# Patient Record
Sex: FEMALE | Race: WHITE | Employment: OTHER | URBAN - METROPOLITAN AREA
[De-identification: names, ages, dates, MRNs, and addresses within clinical notes are randomized per-mention and may not be internally consistent; named-entity substitution may affect disease eponyms.]

---

## 2017-02-27 ENCOUNTER — APPOINTMENT (OUTPATIENT)
Dept: GENERAL RADIOLOGY | Age: 77
End: 2017-02-27
Attending: EMERGENCY MEDICINE
Payer: MEDICARE

## 2017-02-27 ENCOUNTER — APPOINTMENT (OUTPATIENT)
Dept: CT IMAGING | Age: 77
End: 2017-02-27
Attending: EMERGENCY MEDICINE
Payer: MEDICARE

## 2017-02-27 ENCOUNTER — HOSPITAL ENCOUNTER (EMERGENCY)
Age: 77
Discharge: HOME | End: 2017-02-27
Attending: EMERGENCY MEDICINE | Admitting: EMERGENCY MEDICINE
Payer: MEDICARE

## 2017-02-27 VITALS
TEMPERATURE: 99.2 F | WEIGHT: 109.79 LBS | HEART RATE: 98 BPM | DIASTOLIC BLOOD PRESSURE: 68 MMHG | BODY MASS INDEX: 21.55 KG/M2 | HEIGHT: 60 IN | RESPIRATION RATE: 28 BRPM | OXYGEN SATURATION: 94 % | SYSTOLIC BLOOD PRESSURE: 181 MMHG

## 2017-02-27 DIAGNOSIS — N63.0 BREAST MASS IN FEMALE: ICD-10-CM

## 2017-02-27 DIAGNOSIS — J90 PLEURAL EFFUSION: ICD-10-CM

## 2017-02-27 DIAGNOSIS — I15.8 OTHER SECONDARY HYPERTENSION: ICD-10-CM

## 2017-02-27 DIAGNOSIS — R00.2 PALPITATIONS: Primary | ICD-10-CM

## 2017-02-27 DIAGNOSIS — I49.1 PAC (PREMATURE ATRIAL CONTRACTION): ICD-10-CM

## 2017-02-27 LAB
ANION GAP BLD CALC-SCNC: 8 MMOL/L (ref 5–15)
ATRIAL RATE: 110 BPM
BASOPHILS # BLD AUTO: 0 K/UL (ref 0–0.1)
BASOPHILS # BLD: 1 % (ref 0–1)
BUN SERPL-MCNC: 17 MG/DL (ref 6–20)
BUN/CREAT SERPL: 5 (ref 12–20)
CALCIUM SERPL-MCNC: 8 MG/DL (ref 8.5–10.1)
CALCULATED P AXIS, ECG09: 62 DEGREES
CALCULATED R AXIS, ECG10: -17 DEGREES
CALCULATED T AXIS, ECG11: 48 DEGREES
CHLORIDE SERPL-SCNC: 104 MMOL/L (ref 97–108)
CO2 SERPL-SCNC: 30 MMOL/L (ref 21–32)
CREAT SERPL-MCNC: 3.76 MG/DL (ref 0.55–1.02)
D DIMER PPP FEU-MCNC: 2.3 MG/L FEU (ref 0–0.65)
DIAGNOSIS, 93000: NORMAL
DIFFERENTIAL METHOD BLD: ABNORMAL
EOSINOPHIL # BLD: 0.1 K/UL (ref 0–0.4)
EOSINOPHIL NFR BLD: 1 % (ref 0–7)
ERYTHROCYTE [DISTWIDTH] IN BLOOD BY AUTOMATED COUNT: 14 % (ref 11.5–14.5)
GLUCOSE SERPL-MCNC: 147 MG/DL (ref 65–100)
HCT VFR BLD AUTO: 34.8 % (ref 35–47)
HGB BLD-MCNC: 10.9 G/DL (ref 11.5–16)
LYMPHOCYTES # BLD AUTO: 13 % (ref 12–49)
LYMPHOCYTES # BLD: 1 K/UL (ref 0.8–3.5)
MAGNESIUM SERPL-MCNC: 1.8 MG/DL (ref 1.6–2.4)
MCH RBC QN AUTO: 32 PG (ref 26–34)
MCHC RBC AUTO-ENTMCNC: 31.3 G/DL (ref 30–36.5)
MCV RBC AUTO: 102.1 FL (ref 80–99)
MONOCYTES # BLD: 0.5 K/UL (ref 0–1)
MONOCYTES NFR BLD AUTO: 6 % (ref 5–13)
NEUTS SEG # BLD: 6.4 K/UL (ref 1.8–8)
NEUTS SEG NFR BLD AUTO: 79 % (ref 32–75)
P-R INTERVAL, ECG05: 178 MS
PLATELET # BLD AUTO: 244 K/UL (ref 150–400)
POTASSIUM SERPL-SCNC: 4.4 MMOL/L (ref 3.5–5.1)
Q-T INTERVAL, ECG07: 330 MS
QRS DURATION, ECG06: 92 MS
QTC CALCULATION (BEZET), ECG08: 446 MS
RBC # BLD AUTO: 3.41 M/UL (ref 3.8–5.2)
SODIUM SERPL-SCNC: 142 MMOL/L (ref 136–145)
VENTRICULAR RATE, ECG03: 110 BPM
WBC # BLD AUTO: 8 K/UL (ref 3.6–11)

## 2017-02-27 PROCEDURE — 74011636320 HC RX REV CODE- 636/320: Performed by: EMERGENCY MEDICINE

## 2017-02-27 PROCEDURE — 36415 COLL VENOUS BLD VENIPUNCTURE: CPT | Performed by: EMERGENCY MEDICINE

## 2017-02-27 PROCEDURE — 71020 XR CHEST PA LAT: CPT

## 2017-02-27 PROCEDURE — 93005 ELECTROCARDIOGRAM TRACING: CPT

## 2017-02-27 PROCEDURE — 80048 BASIC METABOLIC PNL TOTAL CA: CPT | Performed by: EMERGENCY MEDICINE

## 2017-02-27 PROCEDURE — 74011250636 HC RX REV CODE- 250/636: Performed by: EMERGENCY MEDICINE

## 2017-02-27 PROCEDURE — 99285 EMERGENCY DEPT VISIT HI MDM: CPT

## 2017-02-27 PROCEDURE — 87040 BLOOD CULTURE FOR BACTERIA: CPT | Performed by: EMERGENCY MEDICINE

## 2017-02-27 PROCEDURE — 96360 HYDRATION IV INFUSION INIT: CPT

## 2017-02-27 PROCEDURE — 85025 COMPLETE CBC W/AUTO DIFF WBC: CPT | Performed by: EMERGENCY MEDICINE

## 2017-02-27 PROCEDURE — 83735 ASSAY OF MAGNESIUM: CPT | Performed by: EMERGENCY MEDICINE

## 2017-02-27 PROCEDURE — 71275 CT ANGIOGRAPHY CHEST: CPT

## 2017-02-27 PROCEDURE — 85379 FIBRIN DEGRADATION QUANT: CPT | Performed by: EMERGENCY MEDICINE

## 2017-02-27 RX ADMIN — IOPAMIDOL 69.4 ML: 755 INJECTION, SOLUTION INTRAVENOUS at 15:27

## 2017-02-27 RX ADMIN — SODIUM CHLORIDE 250 ML: 900 INJECTION, SOLUTION INTRAVENOUS at 13:37

## 2017-02-27 NOTE — PROGRESS NOTES
Per Dr Erin Beauchamp, pt is ok to have IV contrast as long as she returns to dialysis within 24 hrs.  Spoke with Dr Filemon James in [place] [address], relayed info regarding need for dialysis following contrast. She stated she will consult with pts nephrologist regarding contrast.

## 2017-02-27 NOTE — ED NOTES
Patient resting on stretcher talking with family at bedside. Patient updated regarding plan of care and plan to perform CTA. Patient verbalizes understanding and agreement to current plan of care. Family remains at bedside. Call bell in reach. Will continue to monitor.

## 2017-02-27 NOTE — ED TRIAGE NOTES
Pt presents ambulatory using her rollator to the treatment room in no distress. Pt denies chest pain but rpts shortness of breath for the past several months. Pt rpts while having dialysis this am staff noted her HR to be irregular on numerous assessments. Sent to the ED for further evaluation.

## 2017-02-27 NOTE — ED PROVIDER NOTES
HPI Comments: 69 yo WF with hx ESRD (m,w,f), AAA, htn, xol, dementia presents with c/o rapid heart beat. Per pt she was at dialysis today and staff told her, her heart rate was fast and irregular. Pt reports increasing sob over the past month, denies fever and cough. States she has never had an irregular heart rate before, denies recent medication changes, or having diasylate changed recently. Pt thinks she had a remote hx of PE but is not on blood thinners and denies recent car trip    Patient is a 68 y.o. female presenting with palpitations. The history is provided by the patient. Palpitations    Associated symptoms include shortness of breath. Pertinent negatives include no fever. Past Medical History:   Diagnosis Date    AAA (abdominal aortic aneurysm) ([facility] [address] 9/2009    3.2 cm AAA infrarenal    Bipolar affective ([facility] [address]     anxiety    Brain aneurysm     5 mm pericollosal, Dr. Ya Lundberg Cervical spine disease     disc, Dr. Malaika Vela CKD (chronic kidney disease) stage 4, GFR 15-29 ml/min ([facility])     Dr. Kelsi Still Crohn disease [facility])     saw GI, presented as GI bleed    Dementia in Alzheimer's disease     Dr. Denise Enciso HTN (hypertension)     Hyperlipidemia     Knee pain     Dr. Edouard Lopez       Past Surgical History:   Procedure Laterality Date   Catheline Ro Dr. Kathe Cogan    HX APPENDECTOMY      HX CATARACT REMOVAL           Family History:   Problem Relation Age of Onset    Diabetes Sister     Heart Disease Father     Crohn's Disease Mother        Social History     Social History    Marital status:      Spouse name: Jose Roberto Knight Number of children: 1    Years of education: N/A     Occupational History    Not on file.      Social History Main Topics    Smoking status: Former Smoker     Packs/day: 2.00     Years: 50.00     Quit date: 6/1/2009    Smokeless tobacco: Former User    Alcohol use 4.8 oz/week     7 Glasses of wine, 1 Standard drinks or equivalent per week      Comment: 1 glass of wine nightly    Drug use: No    Sexual activity: Not on file     Other Topics Concern    Not on file     Social History Narrative    Son Adelso Barrett, lives in [address] [place] [address] Dr: Fd and c blue no.1    Review of Systems   Constitutional: Negative for fever. Respiratory: Positive for shortness of breath. Cardiovascular: Positive for palpitations. All other systems reviewed and are negative. Vitals:    02/27/17 1323   BP: (!) 160/91   Pulse: 82   Resp: 19   Temp: 98.4 °F (36.9 °C)   SpO2: 94%   Weight: 49.8 kg (109 lb 12.6 oz)   Height: 5' (1.524 m)            Physical Exam   Constitutional: She is oriented to person, place, and time. She appears well-developed and well-nourished. No distress. HENT:   Head: Normocephalic and atraumatic. Eyes: Conjunctivae are normal.   Neck: Normal range of motion. Neck supple. Cardiovascular: Regular rhythm, normal heart sounds and intact distal pulses. Exam reveals no friction rub. No murmur heard. tachycardic   Pulmonary/Chest: Effort normal and breath sounds normal. No respiratory distress. She has no wheezes. She has no rales. Abdominal: Soft. Bowel sounds are normal. She exhibits no distension. There is no tenderness. There is no rebound and no guarding. Musculoskeletal: Normal range of motion. She exhibits no edema or tenderness. Neurological: She is alert and oriented to person, place, and time. Coordination normal.   Skin: Skin is warm and dry. She is not diaphoretic. No pallor. Psychiatric: She has a normal mood and affect. Her behavior is normal.   Nursing note and vitals reviewed. MDM  Number of Diagnoses or Management Options  Breast mass in female: Other secondary hypertension:   PAC (premature atrial contraction):   Palpitations:   Pleural effusion:   Diagnosis management comments: Pt with sinus tach and pac's. Check electrolytes, may be from fluid shifts of dialysis. Given small amount of IVF. ? Remote hx PE. Will check ddimer though sob may be from esrd       Amount and/or Complexity of Data Reviewed  Clinical lab tests: ordered and reviewed  Tests in the radiology section of CPT®: ordered and reviewed  Independent visualization of images, tracings, or specimens: yes (ekg)    Patient Progress  Patient progress: stable    ED Course       Procedures    EKG interpretation: (Preliminary)  Rhythm: normal sinus rhythm; and regular . Rate (approx.): 110; Axis: normal; P wave: normal; QRS interval: normal ; ST/T wave: non-specific changes; sinus tach with pac's    2:34 PM  Spoke with Dr Jeannette Rosa about radiologist requesting dialysis within 24 hours due to receiving IV dye. He states if pt is sob she can call tomorrow and they will get her in for dialysis otherwise she can be dialyzed Wednesday and does not need emergent dialysis after contrast     4:21 PM  Spoke with pt and sister at length. She had been cutting back her dialysis time and explained she may need to stay on longer to get rid of the pleural effusions. Also can increase bumex to 2 pills twice a day for 3 days. bp is elevated but pt reports normally 150-200 at dialysis and not due for her meds until 5 pm. She will follow that up with dr Carisa Lewis as well as i explained that is not good. She will follow up breast mass. Heart rate and pac's resolved with small amount of fluid here and now nsr. Pt will return if worsening sx.

## 2017-02-27 NOTE — DISCHARGE INSTRUCTIONS
We hope that we have addressed all of your medical concerns. The examination and treatment you received in the Emergency Department were for an emergent problem and were not intended as complete care. It is important that you follow up with your healthcare provider(s) for ongoing care. If your symptoms worsen or do not improve as expected, and you are unable to reach your usual health care provider(s), you should return to the Emergency Department. Today's healthcare is undergoing tremendous change, and patient satisfaction surveys are one of the many tools to assess the quality of medical care. You may receive a survey from the [employer] regarding your experience in the Emergency Department. I hope that your experience has been completely positive, particularly the medical care that I provided. As such, please participate in the survey; anything less than excellent does not meet my expectations or intentions. [facility] and [facility] participate in nationally recognized quality of care measures. If your blood pressure is greater than 120/80, as reported below, we urge that you seek medical care to address the potential of high blood pressure, commonly known as hypertension. Hypertension can be hereditary or can be caused by certain medical conditions, pain, stress, or \"white coat syndrome. \"       Please make an appointment with your health care provider(s) for follow up of your Emergency Department visit.        VITALS:   Patient Vitals for the past 8 hrs:   Temp Pulse Resp BP SpO2   02/27/17 1612 - 98 28 - 94 %   02/27/17 1600 - 84 27 181/68 (!) 89 %   02/27/17 1530 - 97 23 189/63 94 %   02/27/17 1528 99.2 °F (37.3 °C) 95 (!) 32 196/64 93 %   02/27/17 1502 - 92 (!) 31 178/65 94 %   02/27/17 1450 - - - - 90 %   02/27/17 1430 - 87 23 167/63 92 %   02/27/17 1400 - 96 29 169/63 94 %   02/27/17 1330 - (!) 101 (!) 31 156/63 94 %   02/27/17 1323 98.4 °F (36.9 °C) 82 19 (!) 160/91 94 %          Thank you for allowing us to provide you with medical care today. We realize that you have many choices for your emergency care needs. Please choose us in the future for any continued health care needs. Alda Hunter MD    [facility], [facility].   Office: [phone]            Recent Results (from the past 24 hour(s))   EKG, 12 LEAD, INITIAL    Collection Time: 02/27/17  1:15 PM   Result Value Ref Range    Ventricular Rate 110 BPM    Atrial Rate 110 BPM    P-R Interval 178 ms    QRS Duration 92 ms    Q-T Interval 330 ms    QTC Calculation (Bezet) 446 ms    Calculated P Axis 62 degrees    Calculated R Axis -17 degrees    Calculated T Axis 48 degrees    Diagnosis       Sinus tachycardia with premature atrial complexes  Moderate voltage criteria for LVH, may be normal variant  Anteroseptal infarct , age undetermined  Abnormal ECG  When compared with ECG of 14-SEP-2013 17:27,  premature atrial complexes are now present  Anteroseptal infarct is now present  Confirmed by Viola Hansen M.D., Giuseppe Platt ([id number]) on 2/27/2017 3:58:24 PM     CBC WITH AUTOMATED DIFF    Collection Time: 02/27/17  1:30 PM   Result Value Ref Range    WBC 8.0 3.6 - 11.0 K/uL    RBC 3.41 (L) 3.80 - 5.20 M/uL    HGB 10.9 (L) 11.5 - 16.0 g/dL    HCT 34.8 (L) 35.0 - 47.0 %    .1 (H) 80.0 - 99.0 FL    MCH 32.0 26.0 - 34.0 PG    MCHC 31.3 30.0 - 36.5 g/dL    RDW 14.0 11.5 - 14.5 %    PLATELET 673 118 - 541 K/uL    NEUTROPHILS 79 (H) 32 - 75 %    LYMPHOCYTES 13 12 - 49 %    MONOCYTES 6 5 - 13 %    EOSINOPHILS 1 0 - 7 %    BASOPHILS 1 0 - 1 %    ABS. NEUTROPHILS 6.4 1.8 - 8.0 K/UL    ABS. LYMPHOCYTES 1.0 0.8 - 3.5 K/UL    ABS. MONOCYTES 0.5 0.0 - 1.0 K/UL    ABS. EOSINOPHILS 0.1 0.0 - 0.4 K/UL    ABS.  BASOPHILS 0.0 0.0 - 0.1 K/UL    DF AUTOMATED     METABOLIC PANEL, BASIC    Collection Time: 02/27/17  1:30 PM   Result Value Ref Range    Sodium 142 136 - 145 mmol/L    Potassium 4.4 3.5 - 5.1 mmol/L    Chloride 104 97 - 108 mmol/L    CO2 30 21 - 32 mmol/L    Anion gap 8 5 - 15 mmol/L    Glucose 147 (H) 65 - 100 mg/dL    BUN 17 6 - 20 MG/DL    Creatinine 3.76 (H) 0.55 - 1.02 MG/DL    BUN/Creatinine ratio 5 (L) 12 - 20      GFR est AA 14 (L) >60 ml/min/1.73m2    GFR est non-AA 12 (L) >60 ml/min/1.73m2    Calcium 8.0 (L) 8.5 - 10.1 MG/DL   MAGNESIUM    Collection Time: 02/27/17  1:30 PM   Result Value Ref Range    Magnesium 1.8 1.6 - 2.4 mg/dL   D DIMER    Collection Time: 02/27/17  1:30 PM   Result Value Ref Range    D-dimer 2.30 (H) 0.00 - 0.65 mg/L FEU       Xr Chest Pa Lat    Result Date: 2/27/2017  Indication: Irregular heartbeat. Shortness of breath for the past several months. Exam: PA and lateral views of the chest. Direct comparison is made to prior CXR dated July 11, 2016. Findings: Cardiomediastinal silhouette is within normal limits. There is left basilar patchy opacification consistent with airspace consolidation and a small left pleural effusion. There is a very small right pleural effusion. There is no pneumothorax. IMPRESSION: Left lower lobe patchy airspace consolidation and small left pleural effusion. Very small right pleural effusion. Cta Chest W Wo Cont    Result Date: 2/27/2017  INDICATION:  Shortness of breath times several months, elevated d-dimer. Dialysis this a.m., assess for pulmonary embolus. Right breast cancer. COMPARISON:  7/8/2016. TECHNIQUE:  Noncontrast images were obtained to localize the pulmonary arteries. Contiguous axial CT images were then obtained of the chest with 69 mL of Isovue-370 contrast using the pulmonary angio protocol. 2D post processing was utilized, and coronal and sagittal reformatted images were obtained. In addition 3D post processing was utilized and MIP coronal reconstructed images were obtained.  CT dose reduction was achieved through the use of a standardized protocol tailored for this examination and automatic exposure control for dose modulation. FINDINGS:  No pulmonary embolus is detected. The thoracic aorta the visualized abdominal aorta show diffuse atherosclerotic mural calcification. No dissecting aneurysm is seen. Increased pleural effusions, small on the right and mild-to-moderate on the left with associated lower lobe greater on the left and lingula atelectasis are seen. There is no pericardial effusion. There are small mediastinal and hilar lymph nodes. Slightly increased right breast mass measuring 0.7 x 2.3 cm and previously measuring 1.7 x 2.0 cm is seen. A right lower lobe peripheral nodular opacity measuring 1.3 x 1.3 previously measured 1.2 x 1.2 cm There are no focal abnormalities detected within the visualized liver, spleen, pancreas, and adrenal glands. Poorly delineated upper pole renal hypodensity is seen. IMPRESSION:  No pulmonary embolus detected. Atherosclerotic aorta without aneurysm Increased larger on the left pleural effusions Slightly increased right breast mass. Slight increased right lower lobe peripheral nodular opacity. Breast Lumps: Care Instructions  Your Care Instructions  Breast lumps are common, especially in women between ages 27 and 48. Many womens breasts feel lumpy and tender before their menstrual period. Women also may have lumps when they are breastfeeding. Breast lumps may go away after menopause. All new breast lumps in women after menopause should be checked by a doctor. Although lumps may be normal for you, it is important to have your doctor check any lump or thickness that is not like the rest of your breast to make sure it is not cancer. A lump may be larger, harder, or different from the rest of your breast tissue. Follow-up care is a key part of your treatment and safety. Be sure to make and go to all appointments, and call your doctor if you are having problems.  Its also a good idea to know your test results and keep a list of the medicines you take. How can you care for yourself at home? · Make an appointment to have a mammogram and other follow-up visits as recommended by your doctor. When should you call for help? Call your doctor now or seek immediate medical care if:  · You have new changes in a breast, such as:  ¨ A lump or thickening in your breast or armpit. ¨ A change in the breast's size or shape. ¨ Skin changes, such as dimples or puckers. ¨ Nipple discharge. ¨ A change in the color or feel of the skin of your breast or the darker area around the nipple (areola). ¨ A change in the shape of the nipple (it may look like it's being pulled into the breast). · You have symptoms of a breast infection, such as:  ¨ Increased pain, swelling, redness, or warmth around a breast.  ¨ Red streaks extending from the breast.  ¨ Pus draining from a breast.  ¨ A fever. Watch closely for changes in your health, and be sure to contact your doctor if:  · You do not get better as expected. Where can you learn more? Go to http://linda-meg.info/. Enter F876 in the search box to learn more about \"Breast Lumps: Care Instructions. \"  Current as of: February 25, 2016  Content Version: 11.1  © 2796-8720 [employer]. Care instructions adapted under license by [employer] (which disclaims liability or warranty for this information). If you have questions about a medical condition or this instruction, always ask your healthcare professional. [place] [address] any warranty or liability for your use of this information. Cardiac Arrhythmia: Care Instructions  Your Care Instructions    A cardiac arrhythmia is a change in the normal rhythm of the heart. Your heart may beat too fast or too slow or beat with an irregular or skipping rhythm.  A change in the heart's rhythm may feel like a really strong heartbeat or a fluttering in your chest. A severe heart rhythm problem can keep the body from getting the blood it needs. This can result in shortness of breath, lightheadedness, and fainting. You may take medicine to treat your condition. Your doctor may recommend a pacemaker or recommend catheter ablation to destroy small parts of the heart that are causing a rhythm problem. Another possible treatment is an implantable cardioverter-defibrillator (ICD). An ICD is a device that gives the heart a shock to return the heart to a normal rhythm. Follow-up care is a key part of your treatment and safety. Be sure to make and go to all appointments, and call your doctor if you are having problems. It's also a good idea to know your test results and keep a list of the medicines you take. How can you care for yourself at home? General care  · Be safe with medicines. Take your medicines exactly as prescribed. Call your doctor if you think you are having a problem with your medicine. You will get more details on the specific medicines your doctor prescribes. · If you received a pacemaker or an ICD, you will get a fact sheet about it. · Wear medical alert jewelry that says you have an abnormal heart rhythm. You can buy this at most [employer]. Lifestyle changes  · Eat a heart-healthy diet. · Stay at a healthy weight. Lose weight if you need to. · Avoid nicotine, too much alcohol, and illegal drugs (meth, speed, and cocaine). Also, get enough sleep and do not overeat. · Ask your doctor whether you can take over-the-counter medicines (such as decongestants). These can make your heart beat fast.  · Talk to your doctor about any limits to activities, such as driving, or tasks where you use power tools or ladders. Activity  · Start light exercise if your doctor says you can. Even a small amount will help you get stronger, have more energy, and manage your stress. · If your doctor recommends it, get more exercise. Walking is a good choice. Bit by bit, increase the amount you walk every day.  Try for at least 30 minutes on most days of the week. You also may want to swim, bike, or do other activities. · When you exercise, watch for signs that your heart is working too hard. You are pushing too hard if you cannot talk while you exercise. If you become short of breath or dizzy or have chest pain, sit down and rest.  · Check your pulse daily. Place two fingers on the artery at the palm side of your wrist, in line with your thumb. If your heartbeat seems uneven, talk to your doctor. When should you call for help? Call 911 anytime you think you may need emergency care. For example, call if:  · You passed out (lost consciousness). · You have symptoms of a heart attack. These may include:  ¨ Chest pain or pressure, or a strange feeling in the chest.  ¨ Sweating. ¨ Shortness of breath. ¨ Nausea or vomiting. ¨ Pain, pressure, or a strange feeling in the back, neck, jaw, or upper belly or in one or both shoulders or arms. ¨ Lightheadedness or sudden weakness. ¨ A fast or irregular heartbeat. After you call 911, the  may tell you to chew 1 adult-strength or 2 to 4 low-dose aspirin. Wait for an ambulance. Do not try to drive yourself. · You have signs of a stroke. These include:  ¨ Sudden numbness, paralysis, or weakness in your face, arm, or leg, especially on only one side of your body. ¨ New problems with walking or balance. ¨ Sudden vision changes. ¨ Drooling or slurred speech. ¨ New problems speaking or understanding simple statements, or feeling confused. ¨ A sudden, severe headache that is different from past headaches. Call your doctor now or seek immediate medical care if:  · You are dizzy or lightheaded, or you feel like you may faint. · You have new or increased shortness of breath. · You had surgery and you have signs of infection, such as:  ¨ Increased pain, swelling, warmth, or redness. ¨ Red streaks leading from the cut (incision). ¨ Pus draining from the incision. ¨ A fever.   Watch closely for changes in your health, and be sure to contact your doctor if:  · You do not get better as expected. Where can you learn more? Go to http://linda-meg.info/. Enter W398 in the search box to learn more about \"Cardiac Arrhythmia: Care Instructions. \"  Current as of: May 5, 2016  Content Version: 11.1  © 7050-9470 [employer]. Care instructions adapted under license by [employer] (which disclaims liability or warranty for this information). If you have questions about a medical condition or this instruction, always ask your healthcare professional. [place] [address] any warranty or liability for your use of this information. Palpitations: Care Instructions  Your Care Instructions    Heart palpitations are the uncomfortable sensation that your heart is beating fast or irregularly. You might feel pounding or fluttering in your chest. It might feel like your heart is skipping a beat. Although palpitations may be caused by a heart problem, they also occur because of stress, fatigue, or use of alcohol, caffeine, or nicotine. Many medicines, including diet pills, antihistamines, decongestants, and some herbal products, can cause heart palpitations. Nearly everyone has palpitations from time to time. Depending on your symptoms, your doctor may need to do more tests to try to find the cause of your palpitations. Follow-up care is a key part of your treatment and safety. Be sure to make and go to all appointments, and call your doctor if you are having problems. It's also a good idea to know your test results and keep a list of the medicines you take. How can you care for yourself at home? · Avoid caffeine, nicotine, and excess alcohol. · Do not take illegal drugs, such as methamphetamines and cocaine. · Do not take weight loss or diet medicines unless you talk with your doctor first.  · Get plenty of sleep. · Do not overeat.   · If you have palpitations again, take deep breaths and try to relax. This may slow a racing heart. · If you start to feel lightheaded, lie down to avoid injuries that might result if you pass out and fall down. · Keep a record of your palpitations and bring it to your next doctor's appointment. Write down:  ¨ The date and time. ¨ Your pulse. (If your heart is beating fast, it may be hard to count your pulse.)  ¨ What you were doing when the palpitations started. ¨ How long the palpitations lasted. ¨ Any other symptoms. · If an activity causes palpitations, slow down or stop. Talk to your doctor before you do that activity again. · Take your medicines exactly as prescribed. Call your doctor if you think you are having a problem with your medicine. When should you call for help? Call 911 anytime you think you may need emergency care. For example, call if:  · You passed out (lost consciousness). · You have symptoms of a heart attack. These may include:  ¨ Chest pain or pressure, or a strange feeling in the chest.  ¨ Sweating. ¨ Shortness of breath. ¨ Pain, pressure, or a strange feeling in the back, neck, jaw, or upper belly or in one or both shoulders or arms. ¨ Lightheadedness or sudden weakness. ¨ A fast or irregular heartbeat. After you call 911, the  may tell you to chew 1 adult-strength or 2 to 4 low-dose aspirin. Wait for an ambulance. Do not try to drive yourself. · You have symptoms of a stroke. These may include:  ¨ Sudden numbness, tingling, weakness, or loss of movement in your face, arm, or leg, especially on only one side of your body. ¨ Sudden vision changes. ¨ Sudden trouble speaking. ¨ Sudden confusion or trouble understanding simple statements. ¨ Sudden problems with walking or balance. ¨ A sudden, severe headache that is different from past headaches.   Call your doctor now or seek immediate medical care if:  · You have heart palpitations and:  ¨ Are dizzy or lightheaded, or you feel like you may faint. ¨ Have new or increased shortness of breath. Watch closely for changes in your health, and be sure to contact your doctor if:  · You continue to have heart palpitations. Where can you learn more? Go to http://linda-emg.info/. Enter R508 in the search box to learn more about \"Palpitations: Care Instructions. \"  Current as of: January 27, 2016  Content Version: 11.1  © 2006-2016 [employer]. Care instructions adapted under license by [employer] (which disclaims liability or warranty for this information). If you have questions about a medical condition or this instruction, always ask your healthcare professional. [place] [address] any warranty or liability for your use of this information. Learning About Pleural Effusion  What is pleural effusion? Pleural effusion (say \"PLER-ramana iv-HGOE-schi\") is the buildup of fluid in the space between tissues lining the lungs and chest wall. Because of the fluid buildup, the lungs may not be able to expand completely, which can make it hard to breathe. The lung, or part of it, may collapse. Pleural effusion has many causes, such as pneumonia, cancer, inflammation of the tissues around the lungs, and heart failure. Pleural effusion is usually diagnosed with an X-ray and a physical exam. The doctor listens to the air flow in your lungs. What are the symptoms? Symptoms of pleural effusion may include:  · Trouble breathing. · Shortness of breath. · Chest pain. · Fever. · A cough. Minor pleural effusion may not cause any symptoms. How is pleural effusion treated? Doctors may need to treat the condition that is causing pleural effusion. For example, you may get medicines to treat pneumonia or congestive heart failure. Minor pleural effusion often goes away on its own without treatment.   Removing fluid  Pleural effusion can be treated by removing fluid from the space between the tissues around the lungs. This is done with a needle that's put into the chest (thoracentesis). A small amount of the fluid may be sent to a lab to find out what is causing the buildup of fluid. Removing the fluid may help to relieve symptoms, such as shortness of breath and chest pain. It can help the lungs to expand more fully. In some cases, if pleural effusion doesn't get better, a catheter may be placed in the chest. This is a flexible tube that allows fluid to drain from the lungs. The catheter stays in the chest until the doctor removes it. Some people may get a treatment that removes the fluid and then puts a medicine into the chest cavity. This helps to prevent too much fluid from building up again. Follow-up care is a key part of your treatment and safety. Be sure to make and go to all appointments, and call your doctor if you are having problems. It's also a good idea to know your test results and keep a list of the medicines you take. Where can you learn more? Go to http://linda-meg.info/. Enter A920 in the search box to learn more about \"Learning About Pleural Effusion. \"  Current as of: May 23, 2016  Content Version: 11.1  © 3934-7810 [employer]. Care instructions adapted under license by [employer] (which disclaims liability or warranty for this information). If you have questions about a medical condition or this instruction, always ask your healthcare professional. [place] [address] any warranty or liability for your use of this information. Premature Heartbeat: Care Instructions  Your Care Instructions    A premature heartbeat happens when the heart beats earlier than it should. This briefly interrupts the heart's rhythm. You do not usually feel the early heartbeat, and the next beat is stronger. To many people, this feels like a skipped heartbeat or a flutter.   If you have no heart problems, premature heartbeats are not a cause for concern. Most people have them at some time. They may happen more often if you drink a lot of caffeine or alcohol or are under stress. Usually, no cause for a premature heartbeat is found, and no treatment is needed. Follow-up care is a key part of your treatment and safety. Be sure to make and go to all appointments, and call your doctor if you are having problems. It's also a good idea to know your test results and keep a list of the medicines you take. How can you care for yourself at home? · Limit caffeine and other stimulants if they trigger premature heartbeats. · Reduce stress. Avoid people and places that make you feel anxious, if you can. Learn ways to reduce stress, such as biofeedback, guided imagery, and meditation. · Do not smoke or allow others to smoke around you. If you need help quitting, talk to your doctor about stop-smoking programs and medicines. These can increase your chances of quitting for good. · Get at least 30 minutes of exercise on most days of the week. Walking is a good choice. You also may want to do other activities, such as running, swimming, cycling, or playing tennis or team sports. · Get enough sleep. Keep your room dark and quiet, and try to go to bed at the same time every night. · Limit alcohol to 2 drinks a day for men and 1 drink a day for women. Too much alcohol can cause health problems. If drinking alcohol causes more premature heartbeats, do not drink it. · If your doctor prescribes medicine, take it exactly as prescribed. Call your doctor if you think you are having a problem with your medicine. When should you call for help? Call 911 anytime you think you may need emergency care. For example, call if:  · You passed out (lost consciousness). · You have severe shortness of breath. Call your doctor now or seek immediate medical care if:  · You have a heart rate that stays above 120 beats per minute for more than a few minutes.   · You are suddenly dizzy. Watch closely for changes in your health, and be sure to contact your doctor if you have any problems. Where can you learn more? Go to http://linda-meg.info/. Enter D510 in the search box to learn more about \"Premature Heartbeat: Care Instructions. \"  Current as of: January 27, 2016  Content Version: 11.1  © 3347-1234 [employer]. Care instructions adapted under license by [employer] (which disclaims liability or warranty for this information). If you have questions about a medical condition or this instruction, always ask your healthcare professional. [place] [address] any warranty or liability for your use of this information.

## 2017-02-27 NOTE — ED NOTES
Pt resting in stretcher in no distress. IV established and IV fluids infusing via gravity with out difficulty. Ongoing plan of care discussed. Pt informed of time factors with lab/imaging study results. Family at bedside. Call bell within reach; will continue to monitor.

## 2017-02-27 NOTE — ED NOTES
The patient was discharged home by Dr. Christine Gtz in stable condition. The patient is alert and oriented, in no respiratory distress and discharge vital signs obtained. The patient's diagnosis, condition and treatment were explained. The patient expressed understanding. No prescriptions given. No work/school note given. A discharge plan has been developed. A  was not involved in the process. Aftercare instructions were given. Pt ambulatory using her rollator out of the ED with family.

## 2017-02-27 NOTE — ED NOTES
Coco Gee in Radiology called this RN. Despite high creatinine levels, CTA will be performed after consultation with Nephrology, Radiologist, and ED provider.

## 2017-03-05 LAB
BACTERIA SPEC CULT: NORMAL
SERVICE CMNT-IMP: NORMAL

## 2017-03-23 ENCOUNTER — OFFICE VISIT (OUTPATIENT)
Dept: CARDIOLOGY CLINIC | Age: 77
End: 2017-03-23

## 2017-03-23 VITALS
HEIGHT: 60 IN | WEIGHT: 108 LBS | HEART RATE: 72 BPM | DIASTOLIC BLOOD PRESSURE: 78 MMHG | BODY MASS INDEX: 21.2 KG/M2 | SYSTOLIC BLOOD PRESSURE: 182 MMHG

## 2017-03-23 DIAGNOSIS — I10 ESSENTIAL HYPERTENSION: Primary | ICD-10-CM

## 2017-03-23 DIAGNOSIS — I71.40 ABDOMINAL AORTIC ANEURYSM (AAA) WITHOUT RUPTURE: ICD-10-CM

## 2017-03-23 DIAGNOSIS — E78.5 HYPERLIPIDEMIA, UNSPECIFIED HYPERLIPIDEMIA TYPE: ICD-10-CM

## 2017-03-23 RX ORDER — LABETALOL 100 MG/1
100 TABLET, FILM COATED ORAL 2 TIMES DAILY
Qty: 60 TAB | Refills: 12 | Status: SHIPPED | OUTPATIENT
Start: 2017-03-23

## 2017-03-23 NOTE — PATIENT INSTRUCTIONS

## 2017-03-23 NOTE — PROGRESS NOTES
Rose Moore MD. [facility] [place]              Patient: Micha Mckay  : 1940      Today's Date: 3/23/2017            HISTORY OF PRESENT ILLNESS:     History of Present Illness:  Ms. Sonido Michel is referred by the ER for SOB after visit 17. She had pleural effusions noted then. She has been feeling better past few weeks. She has some RAIN, but otherwise OK. Denies orthopnea. No CP. She denies a history of heart problems. She denies history of MI or CHF. She denies seeing cardiology before. She has a small AAA which PCP follows. PAST MEDICAL HISTORY:     Past Medical History:   Diagnosis Date    AAA (abdominal aortic aneurysm) ([facility] [address] 2009    3.2 cm AAA infrarenal    Bipolar affective ([facility] [address]     anxiety    Brain aneurysm     5 mm pericollosal, Dr. Ayesha Mann Breast cancer, right [facility]) 2015 RIGHT Infiltrating ductal carcinoma, grade 1, %. RI 30%, Her 2 -,  No surgery Letrozole: d/c'd due to rash Anastrazole      Cervical spine disease     disc, Dr. Mel Trevino CKD (chronic kidney disease) stage 4, GFR 15-29 ml/min ([facility])     Dr. Ernesto Holden Crohn disease [facility])     saw GI, presented as GI bleed    Dementia in Alzheimer's disease     Dr. Pedro Low ESRD on hemodialysis [facility])     started HD in     HTN (hypertension)     Hyperlipidemia     Knee pain     Dr. Chantel Hernandez         Past Surgical History:   Procedure Laterality Date    BREAST SURGERY PROCEDURE UNLISTED      Biopsies    COLONOSCOPY      Dr. Corbin Nobles HX CATARACT REMOVAL             MEDICATIONS:     Current Outpatient Prescriptions   Medication Sig Dispense Refill    doxercalciferol (HECTOROL) 4 mcg/2 mL injection 1 mL by IntraVENous route every Monday, Wednesday, Friday. 1 mL 0    epoetin evita (EPOGEN;PROCRIT) 10,000 unit/mL injection 1 mL by SubCUTAneous route DIALYSIS MON, WED & FRI.  Indications: RENAL DIALYSIS 1 Vial 0    oxyCODONE IR (ROXICODONE) 5 mg immediate release tablet Take 1 Tab by mouth every four (4) hours as needed for Pain. Max Daily Amount: 30 mg. 10 Tab 0    OLANZapine (ZYPREXA) 7.5 mg tablet Take 7.5 mg by mouth nightly.  sevelamer carbonate (RENVELA) 800 mg tab tab Take 800 mg by mouth three (3) times daily (with meals).  hydrALAZINE (APRESOLINE) 50 mg tablet Take 50 mg by mouth two (2) times a day.  cloNIDine HCl (CATAPRES) 0.1 mg tablet Take 0.1 mg by mouth three (3) times daily.  bumetanide (BUMEX) 1 mg tablet Take 1 mg by mouth two (2) times a day.  amlodipine (NORVASC) 10 mg tablet Take 10 mg by mouth daily. Allergies   Allergen Reactions    Fd And C Blue No.1 Other (comments)     Effected kidneys              SOCIAL HISTORY:     Social History   Substance Use Topics    Smoking status: Former Smoker     Packs/day: 2.00     Years: 50.00     Quit date: 6/1/2009    Smokeless tobacco: Former User    Alcohol use 4.8 oz/week     7 Glasses of wine, 1 Standard drinks or equivalent per week      Comment: 1 glass of wine nightly           FAMILY HISTORY:     Family History   Problem Relation Age of Onset    Diabetes Sister     Heart Disease Father     Crohn's Disease Mother              REVIEW OF SYMPTOMS:     Review of Symptoms:  Constitutional: Negative for fever, chills  HEENT: Negative for nosebleeds, tinnitus, and vision changes. Respiratory: + RAIN  Cardiovascular: Negative for orthopnea, claudication, syncope, and PND. Gastrointestinal: Negative for abdominal pain, diarrhea, melena. Genitourinary: Negative for dysuria  Musculoskeletal: Negative for myalgias. Skin: Negative for rash  Heme: No problems bleeding. Neurological: Negative for speech change and focal weakness.              PHYSICAL EXAM:     Physical Exam:  Visit Vitals    /78    Pulse 72  Comment: regular    Ht 5' (1.524 m)    Wt 108 lb (49 kg)    BMI 21.09 kg/m2     Patient appears generally well, mood and affect are appropriate and pleasant. HEENT:  Hearing intact, non-icteric, normocephalic, atraumatic. Neck Exam: Supple, + JVD sitting up.  + bilat neck bruits   Lung Exam: Clear to auscultation - decreased BS at left lung base   Cardiac Exam: Regular rate and rhythm with 2/6 systolic murmur  Abdomen: Soft, non-tender, normal bowel sounds. No bruits or masses. Extremities: Moves all ext well. Trace lower extremity edema - wearing support hose. Vascular: 2+ dorsalis pedis pulses bilaterally. Psych: Appropriate affect  Neuro - Grossly intact          LABS / OTHER STUDIES:     Lab Results   Component Value Date/Time    Sodium 142 02/27/2017 01:30 PM    Potassium 4.4 02/27/2017 01:30 PM    Chloride 104 02/27/2017 01:30 PM    CO2 30 02/27/2017 01:30 PM    Anion gap 8 02/27/2017 01:30 PM    Glucose 147 02/27/2017 01:30 PM    BUN 17 02/27/2017 01:30 PM    Creatinine 3.76 02/27/2017 01:30 PM    BUN/Creatinine ratio 5 02/27/2017 01:30 PM    GFR est AA 14 02/27/2017 01:30 PM    GFR est non-AA 12 02/27/2017 01:30 PM    Calcium 8.0 02/27/2017 01:30 PM    Bilirubin, total 0.2 07/13/2016 07:51 AM    AST (SGOT) 13 07/13/2016 07:51 AM    Alk.  phosphatase 71 07/13/2016 07:51 AM    Protein, total 5.4 07/13/2016 07:51 AM    Albumin 2.2 07/13/2016 07:51 AM    Globulin 3.2 07/13/2016 07:51 AM    A-G Ratio 0.7 07/13/2016 07:51 AM    ALT (SGPT) 13 07/13/2016 07:51 AM     Lab Results   Component Value Date/Time    WBC 8.0 02/27/2017 01:30 PM    Hemoglobin (POC) 9.9 09/14/2013 04:53 PM    HGB 10.9 02/27/2017 01:30 PM    Hematocrit (POC) 29 09/14/2013 04:53 PM    HCT 34.8 02/27/2017 01:30 PM    PLATELET 760 28/41/3033 01:30 PM    .1 02/27/2017 01:30 PM     No results found for: CHOL, CHOLPOCT, CHOLX, CHLST, CHOLV, HDL, HDLPOC, LDL, LDLCPOC, NLDLCT, DLDL, LDLC, DLDLP, VLDLC, VLDL, TGL, TGLX, TRIGL, [id number], TRIGP, TGLPOCT, CHHD, CHHDX          CARDIAC DIAGNOSTICS:     Cardiac Evaluation Includes:  Echo 9/09 - LVEF 60%, AV sclerosis   Ultrasound Abd 8/14 - Abdominal aortic aneurysm with maximal diameter approximately 2.7 cm    EKG 2/27/17 - Sinus tachycardia with premature atrial complexes, LVH,  Anteroseptal infarct , age undetermined    Chest CTA 2/27/17 - No pulmonary embolus detected. Atherosclerotic aorta without aneurysm. Increased larger on the left pleural effusions. Slightly increased right breast mass. Slight increased right lower lobe peripheral nodular opacity. CXR 2/27/17 - Left lower lobe patchy airspace consolidation and small left pleural effusion. Very small right pleural effusion    I viewed EKG myself. Reviewed prior records and chart updated. ASSESSMENT AND PLAN:     Assessment and Plan:  1) Question of CHF  - Ms. Nicolasa Echeverria is here for a cardiac workup (given pleural effusions and SOB)  - Will check an echo   - will get recent records to review     2) Murmur  - check an echo     3) HTN  - she says her BP is always high, but gets low during HD ( then she says)   -add labetalol 100 mg BID to her regimen     4) AAA  - check aorta duplex    5) Carotid Bruits  - check carotid dopplers     6) ESRD on HD    7) See me back in 4 weeks. Patient expressed understanding of the plan - questions were answered. Shaye Ochoa MD, [address], [address] [place] [address]  Ph: [phone]   Ph [phone]